# Patient Record
Sex: FEMALE | Race: WHITE | NOT HISPANIC OR LATINO | Employment: FULL TIME | URBAN - METROPOLITAN AREA
[De-identification: names, ages, dates, MRNs, and addresses within clinical notes are randomized per-mention and may not be internally consistent; named-entity substitution may affect disease eponyms.]

---

## 2021-03-06 ENCOUNTER — HOSPITAL ENCOUNTER (EMERGENCY)
Facility: MEDICAL CENTER | Age: 61
End: 2021-03-06
Attending: EMERGENCY MEDICINE | Admitting: EMERGENCY MEDICINE
Payer: COMMERCIAL

## 2021-03-06 VITALS
TEMPERATURE: 98.7 F | RESPIRATION RATE: 16 BRPM | BODY MASS INDEX: 48.82 KG/M2 | WEIGHT: 293 LBS | HEART RATE: 55 BPM | HEIGHT: 65 IN | DIASTOLIC BLOOD PRESSURE: 56 MMHG | SYSTOLIC BLOOD PRESSURE: 104 MMHG | OXYGEN SATURATION: 93 %

## 2021-03-06 DIAGNOSIS — M17.12 OSTEOARTHRITIS OF LEFT KNEE, UNSPECIFIED OSTEOARTHRITIS TYPE: ICD-10-CM

## 2021-03-06 DIAGNOSIS — E66.01 MORBID OBESITY (HCC): ICD-10-CM

## 2021-03-06 DIAGNOSIS — R60.0 BILATERAL LOWER EXTREMITY EDEMA: ICD-10-CM

## 2021-03-06 DIAGNOSIS — M25.562 ACUTE PAIN OF LEFT KNEE: ICD-10-CM

## 2021-03-06 LAB
ALBUMIN SERPL BCP-MCNC: 3.7 G/DL (ref 3.2–4.9)
ALBUMIN/GLOB SERPL: 1.2 G/DL
ALP SERPL-CCNC: 77 U/L (ref 30–99)
ALT SERPL-CCNC: 18 U/L (ref 2–50)
ANION GAP SERPL CALC-SCNC: 10 MMOL/L (ref 7–16)
AST SERPL-CCNC: 18 U/L (ref 12–45)
BASOPHILS # BLD AUTO: 0.7 % (ref 0–1.8)
BASOPHILS # BLD: 0.06 K/UL (ref 0–0.12)
BILIRUB SERPL-MCNC: 0.2 MG/DL (ref 0.1–1.5)
BUN SERPL-MCNC: 18 MG/DL (ref 8–22)
CALCIUM SERPL-MCNC: 9.3 MG/DL (ref 8.5–10.5)
CHLORIDE SERPL-SCNC: 102 MMOL/L (ref 96–112)
CO2 SERPL-SCNC: 25 MMOL/L (ref 20–33)
CREAT SERPL-MCNC: 0.83 MG/DL (ref 0.5–1.4)
EOSINOPHIL # BLD AUTO: 0.43 K/UL (ref 0–0.51)
EOSINOPHIL NFR BLD: 4.7 % (ref 0–6.9)
ERYTHROCYTE [DISTWIDTH] IN BLOOD BY AUTOMATED COUNT: 46.1 FL (ref 35.9–50)
ERYTHROCYTE [SEDIMENTATION RATE] IN BLOOD BY WESTERGREN METHOD: 8 MM/HOUR (ref 0–30)
GLOBULIN SER CALC-MCNC: 3.2 G/DL (ref 1.9–3.5)
GLUCOSE SERPL-MCNC: 97 MG/DL (ref 65–99)
HCT VFR BLD AUTO: 41.8 % (ref 37–47)
HGB BLD-MCNC: 13.6 G/DL (ref 12–16)
IMM GRANULOCYTES # BLD AUTO: 0.01 K/UL (ref 0–0.11)
IMM GRANULOCYTES NFR BLD AUTO: 0.1 % (ref 0–0.9)
LYMPHOCYTES # BLD AUTO: 3.91 K/UL (ref 1–4.8)
LYMPHOCYTES NFR BLD: 42.9 % (ref 22–41)
MCH RBC QN AUTO: 31.7 PG (ref 27–33)
MCHC RBC AUTO-ENTMCNC: 32.5 G/DL (ref 33.6–35)
MCV RBC AUTO: 97.4 FL (ref 81.4–97.8)
MONOCYTES # BLD AUTO: 0.82 K/UL (ref 0–0.85)
MONOCYTES NFR BLD AUTO: 9 % (ref 0–13.4)
NEUTROPHILS # BLD AUTO: 3.89 K/UL (ref 2–7.15)
NEUTROPHILS NFR BLD: 42.6 % (ref 44–72)
NRBC # BLD AUTO: 0 K/UL
NRBC BLD-RTO: 0 /100 WBC
PLATELET # BLD AUTO: 250 K/UL (ref 164–446)
PMV BLD AUTO: 9.5 FL (ref 9–12.9)
POTASSIUM SERPL-SCNC: 3.9 MMOL/L (ref 3.6–5.5)
PROT SERPL-MCNC: 6.9 G/DL (ref 6–8.2)
RBC # BLD AUTO: 4.29 M/UL (ref 4.2–5.4)
SODIUM SERPL-SCNC: 137 MMOL/L (ref 135–145)
URATE SERPL-MCNC: 6 MG/DL (ref 1.9–8.2)
WBC # BLD AUTO: 9.1 K/UL (ref 4.8–10.8)

## 2021-03-06 PROCEDURE — 96375 TX/PRO/DX INJ NEW DRUG ADDON: CPT

## 2021-03-06 PROCEDURE — 99284 EMERGENCY DEPT VISIT MOD MDM: CPT

## 2021-03-06 PROCEDURE — 96374 THER/PROPH/DIAG INJ IV PUSH: CPT

## 2021-03-06 PROCEDURE — 80053 COMPREHEN METABOLIC PANEL: CPT

## 2021-03-06 PROCEDURE — 84550 ASSAY OF BLOOD/URIC ACID: CPT

## 2021-03-06 PROCEDURE — 85652 RBC SED RATE AUTOMATED: CPT

## 2021-03-06 PROCEDURE — 85025 COMPLETE CBC W/AUTO DIFF WBC: CPT

## 2021-03-06 PROCEDURE — 700111 HCHG RX REV CODE 636 W/ 250 OVERRIDE (IP): Performed by: EMERGENCY MEDICINE

## 2021-03-06 RX ORDER — ONDANSETRON 2 MG/ML
4 INJECTION INTRAMUSCULAR; INTRAVENOUS ONCE
Status: COMPLETED | OUTPATIENT
Start: 2021-03-06 | End: 2021-03-06

## 2021-03-06 RX ORDER — METHYLPREDNISOLONE 4 MG/1
TABLET ORAL
Qty: 1 EACH | Refills: 0 | Status: SHIPPED | OUTPATIENT
Start: 2021-03-06

## 2021-03-06 RX ORDER — KETOROLAC TROMETHAMINE 30 MG/ML
30 INJECTION, SOLUTION INTRAMUSCULAR; INTRAVENOUS ONCE
Status: COMPLETED | OUTPATIENT
Start: 2021-03-06 | End: 2021-03-06

## 2021-03-06 RX ORDER — PREDNISONE 20 MG/1
40 TABLET ORAL ONCE
Status: COMPLETED | OUTPATIENT
Start: 2021-03-06 | End: 2021-03-06

## 2021-03-06 RX ORDER — MORPHINE SULFATE 4 MG/ML
4 INJECTION, SOLUTION INTRAMUSCULAR; INTRAVENOUS ONCE
Status: COMPLETED | OUTPATIENT
Start: 2021-03-06 | End: 2021-03-06

## 2021-03-06 RX ORDER — HYDROCODONE BITARTRATE AND ACETAMINOPHEN 5; 325 MG/1; MG/1
1 TABLET ORAL EVERY 6 HOURS PRN
Qty: 20 TABLET | Refills: 0 | Status: SHIPPED | OUTPATIENT
Start: 2021-03-06 | End: 2021-03-11

## 2021-03-06 RX ORDER — ONDANSETRON 4 MG/1
4 TABLET, ORALLY DISINTEGRATING ORAL EVERY 6 HOURS PRN
Qty: 10 TABLET | Refills: 0 | Status: SHIPPED | OUTPATIENT
Start: 2021-03-06

## 2021-03-06 RX ADMIN — ONDANSETRON 4 MG: 2 INJECTION INTRAMUSCULAR; INTRAVENOUS at 21:47

## 2021-03-06 RX ADMIN — KETOROLAC TROMETHAMINE 30 MG: 30 INJECTION, SOLUTION INTRAMUSCULAR; INTRAVENOUS at 22:38

## 2021-03-06 RX ADMIN — PREDNISONE 40 MG: 20 TABLET ORAL at 22:37

## 2021-03-06 RX ADMIN — MORPHINE SULFATE 4 MG: 4 INJECTION INTRAVENOUS at 21:47

## 2021-03-07 NOTE — ED PROVIDER NOTES
"ED Provider Note     Scribed for Irene Nguyen D.O. by Annmarie Hernandez. 3/6/2021, 9:07 PM.     Primary care provider: Leanne Rea P.A.-C.  Means of arrival: walk in         History obtained from: patient  History limited by: none noted    CHIEF COMPLAINT  Chief Complaint   Patient presents with   • Knee Pain     Patient has a history of arthritis in both knees, today patient states L knee is very painful today, patient states very stiff, and difficult to walk. Pain level is 5/10, with exertion 10/10       HPI  Madhuri Cobb is a 60 y.o. female who presents to the emergency Department with acute left knee swelling and pain onset today. The patient states normally she can walk, but today the pain and swelling was exacerbated upon standing. The patient reports that the pain is typically in the medial aspect of her knee and feels like a \"stabbing pain\", but sometimes the pain radiates to below her knee. The patient used Aleve and Aspirin for alleviation.The patient denies any potential trauma to her knees.  Patient has a history of osteoarthritis and gout. She was seen by Dr. Petersen (Orthopedics) but did not receive surgery. Instead, she received a cortisone shot for the pain, bu it no longer provides alleviation. She underwent x-rays 2 years ago.  The patient adds a year ago she saw her nurse practitioner and was prescribed medications for her gout. However, she experienced constipation as a side effect and stopped taking it until today, where it provided no alleviation. Patient denies fever, erythema, or warmth to her knee. Patient is allergic to Codeine.     REVIEW OF SYSTEMS  Pertinent positives include left knee swelling and pain. Pertinent negatives include no fever, erythema or warmth to left knee.   See HPI for further details. All other systems are negative.    PAST MEDICAL HISTORY  Past Medical History:   Diagnosis Date   • Arthritis    • GERD (gastroesophageal reflux disease)    • Headache(784.0)    • " "Obese    • Thyroid disease    • Ulcer        FAMILY HISTORY  Family History   Problem Relation Age of Onset   • Heart Disease Mother    • Hyperlipidemia Mother    • Hypertension Mother    • Arthritis Father         OA   • Genetic Disorder Sister         MS   • Heart Disease Maternal Grandmother    • Lung Disease Paternal Grandmother         TB,ASTHMA,COPD   • Heart Disease Paternal Grandfather    • Hypertension Paternal Grandfather    • Hyperlipidemia Paternal Grandfather        SOCIAL HISTORY  Social History     Tobacco Use   • Smoking status: Never Smoker   • Smokeless tobacco: Never Used   Substance Use Topics   • Alcohol use: No   • Drug use: No      Social History     Substance and Sexual Activity   Drug Use No       SURGICAL HISTORY  Past Surgical History:   Procedure Laterality Date   • CHOLECYSTECTOMY     • PRIMARY C SECTION     • TONSILLECTOMY         CURRENT MEDICATIONS    Current Facility-Administered Medications:   •  ketorolac (TORADOL) injection 30 mg, 30 mg, Intravenous, Once, Irene Nguyen D.O.  •  predniSONE (DELTASONE) tablet 40 mg, 40 mg, Oral, Once, Irene Nguyen D.O.    Current Outpatient Medications:   •  methylPREDNISolone (MEDROL DOSEPAK) 4 MG Tablet Therapy Pack, Take with food as directed, Disp: 1 Each, Rfl: 0  •  HYDROcodone-acetaminophen (NORCO) 5-325 MG Tab per tablet, Take 1 tablet by mouth every 6 hours as needed for up to 5 days., Disp: 20 tablet, Rfl: 0  •  ondansetron (ZOFRAN ODT) 4 MG TABLET DISPERSIBLE, Take 1 tablet by mouth every 6 hours as needed for Nausea., Disp: 10 tablet, Rfl: 0  •  levothyroxine (SYNTHROID) 100 MCG TABS, Take 1 Tab by mouth every day., Disp: 90 Tab, Rfl: 4  •  Probiotic Product (PROBIOTIC FORMULA PO), Take  by mouth. Does not take, Disp: , Rfl:     ALLERGIES  Allergies   Allergen Reactions   • Codeine Vomiting       PHYSICAL EXAM  VITAL SIGNS: BP (!) 165/89   Pulse 66   Temp 36.9 °C (98.4 °F) (Temporal)   Resp 16   Ht 1.651 m (5' 5\")   Wt (!) 135 kg " (298 lb)   SpO2 94%   BMI 49.59 kg/m²     Constitutional: Patient is Morbidly obese, moderate distress secondary to her knee pain..   HENT: Normocephalic, atraumatic. PERRL, EOMI  Cardiovascular: Normal heart rate and Regular rhythm. No murmur  Thorax & Lungs: Clear and equal breath sounds with good excursion. No respiratory distress  Skin: Warm, Dry, No erythema, No rashes.   Extremities: 2+ bilateral lower extremity pitting edema, Left knee has tenderness in medial aspect,  no joint effusion, no erythema, warmth or contusion, Peripheral pulses 4/4   Musculoskeletal: Left knee full extension, flexion, Pain with weight-bearing.   Neurologic: Alert & oriented x 3, Normal motor function, Normal sensory function,  DTR's 4/4 bilaterally.  Psychiatric: Very flat affect, Judgment normal, Mood normal.           LABS  Results for orders placed or performed during the hospital encounter of 03/06/21   CBC WITH DIFFERENTIAL   Result Value Ref Range    WBC 9.1 4.8 - 10.8 K/uL    RBC 4.29 4.20 - 5.40 M/uL    Hemoglobin 13.6 12.0 - 16.0 g/dL    Hematocrit 41.8 37.0 - 47.0 %    MCV 97.4 81.4 - 97.8 fL    MCH 31.7 27.0 - 33.0 pg    MCHC 32.5 (L) 33.6 - 35.0 g/dL    RDW 46.1 35.9 - 50.0 fL    Platelet Count 250 164 - 446 K/uL    MPV 9.5 9.0 - 12.9 fL    Neutrophils-Polys 42.60 (L) 44.00 - 72.00 %    Lymphocytes 42.90 (H) 22.00 - 41.00 %    Monocytes 9.00 0.00 - 13.40 %    Eosinophils 4.70 0.00 - 6.90 %    Basophils 0.70 0.00 - 1.80 %    Immature Granulocytes 0.10 0.00 - 0.90 %    Nucleated RBC 0.00 /100 WBC    Neutrophils (Absolute) 3.89 2.00 - 7.15 K/uL    Lymphs (Absolute) 3.91 1.00 - 4.80 K/uL    Monos (Absolute) 0.82 0.00 - 0.85 K/uL    Eos (Absolute) 0.43 0.00 - 0.51 K/uL    Baso (Absolute) 0.06 0.00 - 0.12 K/uL    Immature Granulocytes (abs) 0.01 0.00 - 0.11 K/uL    NRBC (Absolute) 0.00 K/uL   COMP METABOLIC PANEL   Result Value Ref Range    Sodium 137 135 - 145 mmol/L    Potassium 3.9 3.6 - 5.5 mmol/L    Chloride 102 96 -  112 mmol/L    Co2 25 20 - 33 mmol/L    Anion Gap 10.0 7.0 - 16.0    Glucose 97 65 - 99 mg/dL    Bun 18 8 - 22 mg/dL    Creatinine 0.83 0.50 - 1.40 mg/dL    Calcium 9.3 8.5 - 10.5 mg/dL    AST(SGOT) 18 12 - 45 U/L    ALT(SGPT) 18 2 - 50 U/L    Alkaline Phosphatase 77 30 - 99 U/L    Total Bilirubin 0.2 0.1 - 1.5 mg/dL    Albumin 3.7 3.2 - 4.9 g/dL    Total Protein 6.9 6.0 - 8.2 g/dL    Globulin 3.2 1.9 - 3.5 g/dL    A-G Ratio 1.2 g/dL   URIC ACID   Result Value Ref Range    Uric Acid 6.0 1.9 - 8.2 mg/dL   ESTIMATED GFR   Result Value Ref Range    GFR If African American >60 >60 mL/min/1.73 m 2    GFR If Non African American >60 >60 mL/min/1.73 m 2       Labs reviewed by me      COURSE & MEDICAL DECISION MAKING  Pertinent Labs & Imaging studies reviewed. (See chart for details)      9:07 PM - Patient seen and evaluated at bedside. Recommended following up with Ortho for MRI to rule out potential tear in knee. Ordered for Uric Acid, CBC w/ Differential, and CMP to evaluate. Patient will be treated with Zofran 4 mg, Morphine 4 mg, and Toradol 30 mg for her symptoms. Differential diagnoses include, but are not limited to, Medial meniscus tear, Medial collateral ligament strain,Degenerative arthritis  Patient's laboratories were all completely unremarkable including a negative uric acid.  She is improved after the medication and I have reassured her that there is nothing serious going on other than she will need to be reevaluated by orthopedic surgery for an outpatient MRI that we cannot perform in the emergency department.  She was given numbers to [facility] orthopedics, prescription for Medrol Dosepak, Zofran and Norco.  She is apply warm moist compresses to the knee and avoid excessive weightbearing.  She is to call Monday for appointment.  She is discharged in stable and improved condition    10:15 PM - Patient was reevaluated at bedside. Discussed lab results with the patient and informed them that they will be  referred to Ortho. Patient still experiencing knee pain, so will be treated with Deltasone 40 mg.     I reviewed prescription monitoring program for patient's narcotic use before prescribing a scheduled drug.The patient will not drink alcohol nor drive with prescribed medications      In prescribing controlled substances to this patient, I certify that I have obtained and reviewed the medical history this patient I have also made a good reginald effort to obtain applicable records from other providers who have treated the patient and records did not demonstrate any increased risk of substance abuse that would prevent me from prescribing controlled substances.     I have conducted a physical exam and documented it. I have reviewed Ms. Cobb’s prescription history as maintained by the [place] Prescription Monitoring Program.     I have assessed the patient’s risk for abuse, dependency, and addiction using the validated Opioid Risk Tool available at https://www.mdcalc.com/ympqdc-fmqy-kdwb-ort-narcotic-abuse.     Given the above, I believe the benefits of controlled substance therapy outweigh the risks. The reasons for prescribing controlled substances include in my professional opinion, controlled substances are a reasonable choice for this patient. Accordingly, I have discussed the risk and benefits, treatment plan, and alternative therapies with the patient. The patient has been consented for the medication and understands the risks.      I reviewed prescription monitoring program for patient's narcotic use before prescribing a scheduled drug.The patient will not drink alcohol nor drive with prescribed medications. The patient will return for new or worsening symptoms and is stable at the time of discharge.    The patient is referred to a primary physician for blood pressure management, diabetic screening, and for all other preventative health concerns.    DISPOSITION:  Patient will be discharged home in stable  condition.    FOLLOW UP:  Gabriel Pineda M.D.  [address]  [address]  [facility] [address] [phone]    Schedule an appointment as soon as possible for a visit in 2 days        OUTPATIENT MEDICATIONS:  New Prescriptions    HYDROCODONE-ACETAMINOPHEN (NORCO) 5-325 MG TAB PER TABLET    Take 1 tablet by mouth every 6 hours as needed for up to 5 days.    METHYLPREDNISOLONE (MEDROL DOSEPAK) 4 MG TABLET THERAPY PACK    Take with food as directed    ONDANSETRON (ZOFRAN ODT) 4 MG TABLET DISPERSIBLE    Take 1 tablet by mouth every 6 hours as needed for Nausea.         FINAL IMPRESSION  1. Acute pain of left knee    2. Osteoarthritis of left knee, unspecified osteoarthritis type    3. Morbid obesity (HCC)    4. Bilateral lower extremity edema         IAnnmarie (Scribe), am scribing for, and in the presence of, Irene Nguyen D.O..    Electronically signed by: Annmarie Hernandez (Bobby), 3/6/2021    IIrene D.O. personally performed the services described in this documentation, as scribed by Annmarie Hernandez in my presence, and it is both accurate and complete. C    The note accurately reflects work and decisions made by me.  Irene Nguyen D.O.  3/6/2021  11:20 PM

## 2021-03-07 NOTE — ED TRIAGE NOTES
"Chief Complaint   Patient presents with   • Knee Pain     Patient has a history of arthritis in both knees, today patient states L knee is very painful today, patient states very stiff, and difficult to walk. Pain level is 5/10, with exertion 10/10       Patient to triage for above complaint with SO accompanied. Patient receives cortisone shot from orthopedic doctor. Patient states it resolved pain, but states it does not resolve pain anymore.    Pt is alert and oriented, speaking in full sentences, follows commands and responds appropriately to questions. NAD. Resp are even and unlabored.      Pt placed in lobby. Pt educated on triage process. Pt encouraged to alert staff for any changes.     Patient and staff wearing appropriate PPE    BP (!) 165/89   Pulse 66   Temp 36.9 °C (98.4 °F) (Temporal)   Resp 16   Ht 1.651 m (5' 5\")   Wt (!) 135 kg (298 lb)   SpO2 94%   BMI 49.59 kg/m²   "

## 2021-03-07 NOTE — DISCHARGE INSTRUCTIONS
Apply warm moist compresses to your knee 3-4 times daily  Elevate your legs when you get home  Lower your salt content in your diet  Take the medications I am prescribing you for pain with food  Call first thing Monday morning to schedule an appointment with Dr. Pineda with [facility] orthopedics.